# Patient Record
Sex: FEMALE | Race: WHITE
[De-identification: names, ages, dates, MRNs, and addresses within clinical notes are randomized per-mention and may not be internally consistent; named-entity substitution may affect disease eponyms.]

---

## 2017-01-04 ENCOUNTER — HOSPITAL ENCOUNTER (OUTPATIENT)
Dept: HOSPITAL 45 - C.MAMM | Age: 62
Discharge: HOME | End: 2017-01-04
Attending: NURSE PRACTITIONER
Payer: COMMERCIAL

## 2017-01-04 DIAGNOSIS — N63: ICD-10-CM

## 2017-01-04 DIAGNOSIS — Z12.31: Primary | ICD-10-CM

## 2017-01-05 NOTE — MAMMOGRAPHY REPORT
UNILATERAL LEFT DIGITAL SCREENING MAMMOGRAM TOMOSYNTHESIS WITH CAD: 1/4/2017

CLINICAL HISTORY: Asymptomatic. Personal history of breast cancer.  





TECHNIQUE:  Breast tomosynthesis in addition to standard 2D mammography was performed of the left br
east. Current study was also evaluated with a Computer Aided Detection (CAD) system.  



COMPARISON: Comparison is made to exams dated:  6/19/2015 mammogram, 11/23/2012 mammogram, 7/29/2011
 mammogram, 7/6/2010 mammogram - [facility], and 6/5/2009.   



BREAST COMPOSITION:  The tissue of the left breast is heterogeneously dense, which may obscure small
 masses.  



FINDINGS:  There is a possible new 6 mm mass in the upper inner middle to posterior left breast, bes
t seen on the CC tomosynthesis slice 29, for which additional spot compression tomosynthesis HD view
s and possibly ultrasound are recommended.



Otherwise, there are stable postsurgical changes throughout the left breast and a stable metallic bi
opsy marker in the superior breast.  Benign vascular calcifications and rim calcifications are again
 seen. No other suspicious mass, architectural distortion or cluster of microcalcifications is seen.
  



IMPRESSION:  ACR BI-RADS CATEGORY 0: INCOMPLETE EVALUATION:  NEED ADDITIONAL IMAGING EVALUATION

The possible new 6 mm mass in the left breast needs additional evaluation.  

The patient will be called to schedule an appointment.  





Approximately 10% of breast cancers are not detected with mammography. A negative mammographic repor
t should not delay biopsy if a clinically suggestive mass is present.



Cely Mera M.D.          

ay/:1/4/2017 17:13:28  



Imaging Technologist: Arina ELLIS(R)(M), [facility]

letter sent: Addl Imaging 0  

BI-RADS Code: ACR BI-RADS Category 0: Incomplete Evaluation:  Need Additional Imaging Evaluation

## 2017-01-20 ENCOUNTER — HOSPITAL ENCOUNTER (OUTPATIENT)
Dept: HOSPITAL 45 - C.MAMM | Age: 62
Discharge: HOME | End: 2017-01-20
Attending: NURSE PRACTITIONER
Payer: COMMERCIAL

## 2017-01-20 DIAGNOSIS — N64.89: Primary | ICD-10-CM

## 2017-01-20 NOTE — MAMMOGRAPHY REPORT
UNILATERAL LEFT DIGITAL DIAGNOSTIC MAMMOGRAM TOMOSYNTHESIS AND TARGETED LEFT ULTRASOUND: 1/20/2017

CLINICAL HISTORY: Callback from screening mammogram for left breast asymmetry.  





TECHNIQUE:  Breast tomosynthesis in addition to standard 2D mammography was performed.  Left CC and 
MLO 2-D and tomosynthesis spot compression views were obtained.



COMPARISON: Comparison is made to exams dated:  1/4/2017 mammogram, 6/19/2015 mammogram, 1/15/2014 m
ammogram, 11/23/2012 mammogram, 7/29/2011 mammogram, and 7/13/2011 mammogram - [facility].   



BREAST COMPOSITION:  The tissue of the left breast is heterogeneously dense, which may obscure small
 masses.  



FINDINGS:  The previously seen asymmetry in the left upper inner quadrant effaces on the additional 
spot compression views, with appearance of this region similar to prior exams dating back to at leas
t 2009. No suspicious mass or other suspicious abnormality is seen in this region on the tomosynthes
is images.



Targeted ultrasound was performed of the left medial breast in the region of the mammographic asymme
try.  Sonographically normal tissue is seen, without evidence of a mass or other suspicious sonograp
hic abnormality.  Expected postsurgical changes are seen within the left breast at 9:00 on ultrasoun
d at the site of her lumpectomy scar.





IMPRESSION:  ACR BI-RADS CATEGORY 2: BENIGN, TARGETED ULTRASOUND ACR BI-RADS CATEGORY 2: BENIGN 

The left breast asymmetry effaces on the additional views, without corresponding suspicious sonograp
hic abnormalities evident.  The asymmetry is benign and compatible with normal overlapping fibroglan
dular tissue.  There is no mammographic or targeted sonographic evidence of malignancy. A 1 year scr
eening mammogram is recommended.  The patient has been verbally notified of the results.  





Approximately 10% of breast cancers are not detected with mammography. A negative mammographic repor
t should not delay biopsy if a clinically suggestive mass is present.



Rabia Cano M.D.          

/:1/20/2017 14:39:38  



Imaging Technologist: Etelvina Vasques, [facility]

letter sent: Normal 1/2  

BI-RADS Code: ACR BI-RADS Category 2: Benign  Ultrasound BI-RADS: ACR BI-RADS Category 2: Benign

## 2017-02-07 ENCOUNTER — HOSPITAL ENCOUNTER (OUTPATIENT)
Dept: HOSPITAL 45 - C.LABPVFM | Age: 62
Discharge: HOME | End: 2017-02-07
Attending: NURSE PRACTITIONER
Payer: COMMERCIAL

## 2017-02-07 DIAGNOSIS — D51.0: Primary | ICD-10-CM

## 2017-08-22 ENCOUNTER — HOSPITAL ENCOUNTER (OUTPATIENT)
Dept: HOSPITAL 45 - C.LABPVFM | Age: 62
Discharge: HOME | End: 2017-08-22
Attending: NURSE PRACTITIONER
Payer: COMMERCIAL

## 2017-08-22 DIAGNOSIS — I10: ICD-10-CM

## 2017-08-22 DIAGNOSIS — D51.0: ICD-10-CM

## 2017-08-22 DIAGNOSIS — E55.9: Primary | ICD-10-CM

## 2017-08-22 DIAGNOSIS — E89.0: ICD-10-CM

## 2017-08-22 LAB
ALBUMIN/GLOB SERPL: 1.3 {RATIO} (ref 0.9–2)
ALP SERPL-CCNC: 108 U/L (ref 45–117)
ALT SERPL-CCNC: 69 U/L (ref 12–78)
ANION GAP SERPL CALC-SCNC: 8 MMOL/L (ref 3–11)
AST SERPL-CCNC: 55 U/L (ref 15–37)
BUN SERPL-MCNC: 14 MG/DL (ref 7–18)
BUN/CREAT SERPL: 14.7 (ref 10–20)
CALCIUM SERPL-MCNC: 9.2 MG/DL (ref 8.5–10.1)
CHLORIDE SERPL-SCNC: 95 MMOL/L (ref 98–107)
CHOLEST/HDLC SERPL: 1.6 {RATIO}
CO2 SERPL-SCNC: 31 MMOL/L (ref 21–32)
CREAT SERPL-MCNC: 0.92 MG/DL (ref 0.6–1.2)
GLOBULIN SER-MCNC: 3.5 GM/DL (ref 2.5–4)
GLUCOSE SERPL-MCNC: 91 MG/DL (ref 70–99)
GLUCOSE UR QL: 135 MG/DL
KETONES UR QL STRIP: 62 MG/DL
NITRITE UR QL STRIP: 108 MG/DL (ref 0–150)
PH UR: 219 MG/DL (ref 0–200)
POTASSIUM SERPL-SCNC: 3.2 MMOL/L (ref 3.5–5.1)
SODIUM SERPL-SCNC: 134 MMOL/L (ref 136–145)
TSH SERPL-ACNC: 1.61 UIU/ML (ref 0.3–4.5)
VERY LOW DENSITY LIPOPROT CALC: 22 MG/DL

## 2017-09-06 ENCOUNTER — HOSPITAL ENCOUNTER (OUTPATIENT)
Dept: HOSPITAL 45 - C.LABPVFM | Age: 62
Discharge: HOME | End: 2017-09-06
Attending: NURSE PRACTITIONER
Payer: COMMERCIAL

## 2017-09-06 DIAGNOSIS — E87.6: ICD-10-CM

## 2017-09-06 DIAGNOSIS — R74.0: Primary | ICD-10-CM

## 2017-09-06 LAB
ALP SERPL-CCNC: 111 U/L (ref 45–117)
ALT SERPL-CCNC: 38 U/L (ref 12–78)
ANION GAP SERPL CALC-SCNC: 6 MMOL/L (ref 3–11)
AST SERPL-CCNC: 26 U/L (ref 15–37)
BUN SERPL-MCNC: 16 MG/DL (ref 7–18)
BUN/CREAT SERPL: 20.3 (ref 10–20)
CALCIUM SERPL-MCNC: 9 MG/DL (ref 8.5–10.1)
CHLORIDE SERPL-SCNC: 103 MMOL/L (ref 98–107)
CO2 SERPL-SCNC: 28 MMOL/L (ref 21–32)
CREAT SERPL-MCNC: 0.8 MG/DL (ref 0.6–1.2)
GLUCOSE SERPL-MCNC: 95 MG/DL (ref 70–99)
POTASSIUM SERPL-SCNC: 4.4 MMOL/L (ref 3.5–5.1)
SODIUM SERPL-SCNC: 137 MMOL/L (ref 136–145)

## 2017-09-15 ENCOUNTER — HOSPITAL ENCOUNTER (OUTPATIENT)
Dept: HOSPITAL 45 - C.ULTR | Age: 62
Discharge: HOME | End: 2017-09-15
Attending: NURSE PRACTITIONER
Payer: COMMERCIAL

## 2017-09-15 DIAGNOSIS — R74.0: Primary | ICD-10-CM

## 2017-09-15 NOTE — DIAGNOSTIC IMAGING REPORT
ABDOMINAL ULTRASOUND, RIGHT UPPER QUADRANT



HISTORY: Elevated AST (SGOT.



COMPARISON:  None.



FINDINGS: Liver morphology is normal. Hepatic echogenicity is at the upper

limits of normal. No hepatic lesions are identified. There is no biliary ductal

dilatation. The common bile duct measures 3 mm in caliber. There are no

gallstones. The pancreas is within normal limits by sonography. There is no

right hydronephrosis.



IMPRESSION: 

No significant abnormality identified within the right upper quadrant.







Electronically signed by:  Taran Roman M.D.

9/15/2017 2:42 PM



Dictated Date/Time:  9/15/2017 2:41 PM

## 2018-02-14 ENCOUNTER — HOSPITAL ENCOUNTER (OUTPATIENT)
Dept: HOSPITAL 45 - C.MAMM | Age: 63
End: 2018-02-14
Attending: NURSE PRACTITIONER
Payer: COMMERCIAL

## 2018-02-14 DIAGNOSIS — Z90.11: ICD-10-CM

## 2018-02-14 DIAGNOSIS — Z12.31: Primary | ICD-10-CM

## 2018-02-15 NOTE — MAMMOGRAPHY REPORT
UNILATERAL LEFT DIGITAL SCREENING MAMMOGRAM TOMOSYNTHESIS WITH CAD: 2/14/2018

CLINICAL HISTORY: Asymptomatic. Personal history of breast cancer.  





TECHNIQUE:  Breast tomosynthesis in addition to standard 2D mammography was performed. Current study 
was also evaluated with a Computer Aided Detection (CAD) system.  



COMPARISON: Comparison is made to exams dated:  1/20/2017 mammogram, 1/4/2017 mammogram, 6/19/2015 ma
mmogram, 1/15/2014 mammogram, 11/23/2012 mammogram, and 1/20/2017 ultrasound - [facility].   



BREAST COMPOSITION:  The tissue of the left breast is heterogeneously dense, which may obscure small 
masses.  



FINDINGS: A linear scar marker overlies the upper inner question of the left breast.  There is expect
ed architectural distortion and lucency at the site of prior surgery in the upper inner quadrant.  A 
stable ribbon-shaped biopsy marker clip in the upper outer quadrant of the left breast.  Scattered be
nign rim calcifications and mild vascular calcification.  No suspicious mass, architectural distortio
n or cluster of suspicious microcalcifications is seen.  



IMPRESSION:  ACR BI-RADS CATEGORY 1: NEGATIVE

There is no mammographic evidence of malignancy. A 1 year screening mammogram is recommended.  The pa
tient will receive written notification of the results.  





Approximately 10% of breast cancers are not detected with mammography. A negative mammographic report
 should not delay biopsy if a clinically suggestive mass is present.



Cely Mera M.D.          

ay/:2/14/2018 16:53:26  



Imaging Technologist: Sierra ELLIS(PAM)(ADY), [facility]

letter sent: Normal 1/2  

BI-RADS Code: ACR BI-RADS Category 1: Negative

## 2018-07-27 ENCOUNTER — HOSPITAL ENCOUNTER (OUTPATIENT)
Dept: HOSPITAL 45 - C.LABPVFM | Age: 63
Discharge: HOME | End: 2018-07-27
Attending: NURSE PRACTITIONER
Payer: COMMERCIAL

## 2018-07-27 DIAGNOSIS — R17: Primary | ICD-10-CM

## 2018-07-27 LAB
ALBUMIN SERPL-MCNC: 4.5 GM/DL (ref 3.4–5)
ALP SERPL-CCNC: 96 U/L (ref 45–117)
ALT SERPL-CCNC: 55 U/L (ref 12–78)
AST SERPL-CCNC: 37 U/L (ref 15–37)
BASOPHILS # BLD: 0 K/UL (ref 0–0.2)
BASOPHILS NFR BLD: 0 %
EOS ABS #: 0 K/UL (ref 0–0.5)
EOSINOPHIL NFR BLD AUTO: 295 K/UL (ref 130–400)
HCT VFR BLD CALC: 34.1 % (ref 37–47)
HGB BLD-MCNC: 11.4 G/DL (ref 12–16)
IG#: 0.01 K/UL (ref 0–0.02)
IMM GRANULOCYTES NFR BLD AUTO: 25.4 %
LYMPHOCYTES # BLD: 0.74 K/UL (ref 1.2–3.4)
MCH RBC QN AUTO: 36 PG (ref 25–34)
MCHC RBC AUTO-ENTMCNC: 33.4 G/DL (ref 32–36)
MCV RBC AUTO: 107.6 FL (ref 80–100)
MONO ABS #: 0.35 K/UL (ref 0.11–0.59)
MONOCYTES NFR BLD: 12 %
NEUT ABS #: 1.81 K/UL (ref 1.4–6.5)
NEUTROPHILS # BLD AUTO: 0 %
NEUTROPHILS NFR BLD AUTO: 62.3 %
PMV BLD AUTO: 10 FL (ref 7.4–10.4)
PROT SERPL-MCNC: 7.8 GM/DL (ref 6.4–8.2)
RED CELL DISTRIBUTION WIDTH CV: 13 % (ref 11.5–14.5)
RED CELL DISTRIBUTION WIDTH SD: 51.4 FL (ref 36.4–46.3)
WBC # BLD AUTO: 2.91 K/UL (ref 4.8–10.8)